# Patient Record
Sex: FEMALE | Race: WHITE | URBAN - METROPOLITAN AREA
[De-identification: names, ages, dates, MRNs, and addresses within clinical notes are randomized per-mention and may not be internally consistent; named-entity substitution may affect disease eponyms.]

---

## 2018-04-21 ENCOUNTER — HOSPITAL ENCOUNTER (EMERGENCY)
Facility: CLINIC | Age: 43
Discharge: HOME | End: 2018-04-21
Attending: EMERGENCY MEDICINE | Admitting: EMERGENCY MEDICINE
Payer: COMMERCIAL

## 2018-04-21 VITALS
SYSTOLIC BLOOD PRESSURE: 120 MMHG | HEART RATE: 67 BPM | DIASTOLIC BLOOD PRESSURE: 78 MMHG | BODY MASS INDEX: 21.4 KG/M2 | WEIGHT: 128.6 LBS | RESPIRATION RATE: 16 BRPM | TEMPERATURE: 98.5 F | OXYGEN SATURATION: 98 %

## 2018-04-21 DIAGNOSIS — R11.0 NAUSEA: ICD-10-CM

## 2018-04-21 DIAGNOSIS — R21 RASH: ICD-10-CM

## 2018-04-21 LAB
ANION GAP SERPL CALCULATED.3IONS-SCNC: 8 MMOL/L (ref 3–14)
BASOPHILS # BLD AUTO: 0 10E9/L (ref 0–0.2)
BASOPHILS NFR BLD AUTO: 0.5 %
BUN SERPL-MCNC: 14 MG/DL (ref 7–30)
CALCIUM SERPL-MCNC: 8.3 MG/DL (ref 8.5–10.1)
CHLORIDE SERPL-SCNC: 110 MMOL/L (ref 94–109)
CO2 SERPL-SCNC: 26 MMOL/L (ref 20–32)
CREAT SERPL-MCNC: 0.68 MG/DL (ref 0.52–1.04)
DIFFERENTIAL METHOD BLD: NORMAL
EOSINOPHIL # BLD AUTO: 0.2 10E9/L (ref 0–0.7)
EOSINOPHIL NFR BLD AUTO: 2.1 %
ERYTHROCYTE [DISTWIDTH] IN BLOOD BY AUTOMATED COUNT: 12.8 % (ref 10–15)
GFR SERPL CREATININE-BSD FRML MDRD: >90 ML/MIN/1.7M2
GLUCOSE SERPL-MCNC: 91 MG/DL (ref 70–99)
HCT VFR BLD AUTO: 41.8 % (ref 35–47)
HGB BLD-MCNC: 13.4 G/DL (ref 11.7–15.7)
IMM GRANULOCYTES # BLD: 0 10E9/L (ref 0–0.4)
IMM GRANULOCYTES NFR BLD: 0.2 %
LYMPHOCYTES # BLD AUTO: 1.7 10E9/L (ref 0.8–5.3)
LYMPHOCYTES NFR BLD AUTO: 19.8 %
MCH RBC QN AUTO: 29.3 PG (ref 26.5–33)
MCHC RBC AUTO-ENTMCNC: 32.1 G/DL (ref 31.5–36.5)
MCV RBC AUTO: 92 FL (ref 78–100)
MONOCYTES # BLD AUTO: 0.5 10E9/L (ref 0–1.3)
MONOCYTES NFR BLD AUTO: 5.6 %
NEUTROPHILS # BLD AUTO: 6.2 10E9/L (ref 1.6–8.3)
NEUTROPHILS NFR BLD AUTO: 71.8 %
NRBC # BLD AUTO: 0 10*3/UL
NRBC BLD AUTO-RTO: 0 /100
PLATELET # BLD AUTO: 213 10E9/L (ref 150–450)
POTASSIUM SERPL-SCNC: 4.1 MMOL/L (ref 3.4–5.3)
RBC # BLD AUTO: 4.57 10E12/L (ref 3.8–5.2)
SODIUM SERPL-SCNC: 144 MMOL/L (ref 133–144)
TSH SERPL DL<=0.005 MIU/L-ACNC: 1.25 MU/L (ref 0.4–4)
WBC # BLD AUTO: 8.6 10E9/L (ref 4–11)

## 2018-04-21 PROCEDURE — 85025 COMPLETE CBC W/AUTO DIFF WBC: CPT | Performed by: EMERGENCY MEDICINE

## 2018-04-21 PROCEDURE — 25000128 H RX IP 250 OP 636: Performed by: EMERGENCY MEDICINE

## 2018-04-21 PROCEDURE — 99283 EMERGENCY DEPT VISIT LOW MDM: CPT | Mod: Z6 | Performed by: EMERGENCY MEDICINE

## 2018-04-21 PROCEDURE — 80048 BASIC METABOLIC PNL TOTAL CA: CPT | Performed by: EMERGENCY MEDICINE

## 2018-04-21 PROCEDURE — 84443 ASSAY THYROID STIM HORMONE: CPT | Performed by: EMERGENCY MEDICINE

## 2018-04-21 PROCEDURE — 99283 EMERGENCY DEPT VISIT LOW MDM: CPT | Mod: 25 | Performed by: EMERGENCY MEDICINE

## 2018-04-21 PROCEDURE — 96360 HYDRATION IV INFUSION INIT: CPT | Performed by: EMERGENCY MEDICINE

## 2018-04-21 RX ORDER — CETIRIZINE HYDROCHLORIDE 10 MG/1
10 TABLET ORAL EVERY EVENING
Qty: 7 TABLET | Refills: 0 | Status: SHIPPED | OUTPATIENT
Start: 2018-04-21 | End: 2018-04-28

## 2018-04-21 RX ORDER — ONDANSETRON 8 MG/1
8 TABLET, ORALLY DISINTEGRATING ORAL EVERY 8 HOURS PRN
Qty: 20 TABLET | Refills: 0 | Status: SHIPPED | OUTPATIENT
Start: 2018-04-21 | End: 2018-04-28

## 2018-04-21 RX ORDER — METHYLPREDNISOLONE 4 MG
TABLET, DOSE PACK ORAL
Qty: 21 TABLET | Refills: 0 | Status: SHIPPED | OUTPATIENT
Start: 2018-04-21

## 2018-04-21 RX ADMIN — SODIUM CHLORIDE 1000 ML: 9 INJECTION, SOLUTION INTRAVENOUS at 20:29

## 2018-04-21 NOTE — ED AVS SNAPSHOT
[facility], Emergency Department    [address]    [facility] [address]    Phone:  [phone]    Fax:  [phone]                                       Iesha Hurt   MRN: [id number]    Department:  [facility], Emergency Department   Date of Visit:  4/21/2018           Patient Information     Date Of Birth          1975        Your diagnoses for this visit were:     Rash     Nausea        You were seen by Diana Cervantes MD.        Discharge Instructions       Go and rest.  Stay well hydrated.     For nausea you can take zofran if needed.     Take the medrol dose pack as prescribed.  This will help allergic reactions improve.     Take zyrtec daily for the next 7 days.     You can try benadryl 50 mg at bedtime for sleep.     Please make an appointment to follow up with Dermatology Clinic (phone: [phone]) if no improvement of your symptoms over the next few days.       Today's results:  Results for orders placed or performed during the hospital encounter of 04/21/18 (from the past 24 hour(s))   CBC with platelets differential   Result Value Ref Range    WBC 8.6 4.0 - 11.0 10e9/L    RBC Count 4.57 3.8 - 5.2 10e12/L    Hemoglobin 13.4 11.7 - 15.7 g/dL    Hematocrit 41.8 35.0 - 47.0 %    MCV 92 78 - 100 fl    MCH 29.3 26.5 - 33.0 pg    MCHC 32.1 31.5 - 36.5 g/dL    RDW 12.8 10.0 - 15.0 %    Platelet Count 213 150 - 450 10e9/L    Diff Method Automated Method     % Neutrophils 71.8 %    % Lymphocytes 19.8 %    % Monocytes 5.6 %    % Eosinophils 2.1 %    % Basophils 0.5 %    % Immature Granulocytes 0.2 %    Nucleated RBCs 0 0 /100    Absolute Neutrophil 6.2 1.6 - 8.3 10e9/L    Absolute Lymphocytes 1.7 0.8 - 5.3 10e9/L    Absolute Monocytes 0.5 0.0 - 1.3 10e9/L    Absolute Eosinophils 0.2 0.0 - 0.7 10e9/L    Absolute Basophils 0.0 0.0 - 0.2 10e9/L    Abs Immature Granulocytes 0.0 0 - 0.4 10e9/L    Absolute Nucleated RBC 0.0    Basic metabolic panel   Result Value Ref Range    Sodium 144 133 - 144  mmol/L    Potassium 4.1 3.4 - 5.3 mmol/L    Chloride 110 (H) 94 - 109 mmol/L    Carbon Dioxide 26 20 - 32 mmol/L    Anion Gap 8 3 - 14 mmol/L    Glucose 91 70 - 99 mg/dL    Urea Nitrogen 14 7 - 30 mg/dL    Creatinine 0.68 0.52 - 1.04 mg/dL    GFR Estimate >90 >60 mL/min/1.7m2    GFR Estimate If Black >90 >60 mL/min/1.7m2    Calcium 8.3 (L) 8.5 - 10.1 mg/dL   TSH with free T4 reflex   Result Value Ref Range    TSH 1.25 0.40 - 4.00 mU/L         24 Hour Appointment Hotline       To make an appointment at any [facility] clinic, call [phone]). If you don't have a family doctor or clinic, we will help you find one. [facility] clinics are conveniently located to serve the needs of you and your family.             Review of your medicines      START taking        Dose / Directions Last dose taken    cetirizine 10 MG tablet   Commonly known as:  zyrTEC   Dose:  10 mg   Quantity:  7 tablet        Take 1 tablet (10 mg) by mouth every evening for 7 days   Refills:  0        methylPREDNISolone 4 MG tablet   Commonly known as:  MEDROL DOSEPAK   Quantity:  21 tablet        Follow package instructions   Refills:  0        ondansetron 8 MG ODT tab   Commonly known as:  ZOFRAN-ODT   Dose:  8 mg   Quantity:  20 tablet        Take 1 tablet (8 mg) by mouth every 8 hours as needed for nausea   Refills:  0          Our records show that you are taking the medicines listed below. If these are incorrect, please call your family doctor or clinic.        Dose / Directions Last dose taken    BENADRYL PO   Dose:  25 mg        Take 25 mg by mouth   Refills:  0        * ADVIL PO   Dose:  600 mg        Take 600 mg by mouth   Refills:  0        * ibuprofen 400 MG tablet   Commonly known as:  ADVIL/MOTRIN   Dose:  400-800 mg   Quantity:  90 tablet        Take 1-2 tablets (400-800 mg) by mouth every 6 hours as needed for other (cramping)   Refills:  0        PRENATAL VITAMINS PO   Dose:  1 tablet        Take 1 tablet by mouth daily    Refills:  0        TYLENOL 325 MG tablet   Dose:  325-650 mg   Generic drug:  acetaminophen        Take 325-650 mg by mouth every 6 hours as needed for mild pain   Refills:  0        UNKNOWN TO PATIENT        Unknown fungal cream   Refills:  0        * Notice:  This list has 2 medication(s) that are the same as other medications prescribed for you. Read the directions carefully, and ask your doctor or other care provider to review them with you.            Prescriptions were sent or printed at these locations (3 Prescriptions)                   Other Prescriptions                Printed at Department/Unit printer (3 of 3)         cetirizine (ZYRTEC) 10 MG tablet               methylPREDNISolone (MEDROL DOSEPAK) 4 MG tablet               ondansetron (ZOFRAN-ODT) 8 MG ODT tab                Procedures and tests performed during your visit     Basic metabolic panel    CBC with platelets differential    TSH with free T4 reflex      Orders Needing Specimen Collection     None      Pending Results     No orders found from 4/19/2018 to 4/22/2018.            Pending Culture Results     No orders found from 4/19/2018 to 4/22/2018.            Pending Results Instructions     If you had any lab results that were not finalized at the time of your Discharge, you can call the ED Lab Result RN at [phone]. You will be contacted by this team for any positive Lab results or changes in treatment. The nurses are available 7 days a week from 10A to 6:30P.  You can leave a message 24 hours per day and they will return your call.        Thank you for choosing [facility]       Thank you for choosing [facility] for your care. Our goal is always to provide you with excellent care. Hearing back from our patients is one way we can continue to improve our services. Please take a few minutes to complete the written survey that you may receive in the mail after you visit with us. Thank you!        3rd [employer] Information     [employer] gives you  secure access to your electronic health record. If you see a primary care provider, you can also send messages to your care team and make appointments. If you have questions, please call your primary care clinic.  If you do not have a primary care provider, please call [phone] and they will assist you.        Care EveryWhere ID     This is your Care EveryWhere ID. This could be used by other organizations to access your [facility] medical records  [id number]        Equal Access to Services     NAYLA ARCHIBALD : Myrtle tenao Shaw, wacierada lufawn, qaybta kaalmalibertad tanner, sivakumar boo. So [facility] [phone].    ATENCIÓN: Si habla español, tiene a damon disposición servicios gratuitos de asistencia lingüística. Llame al [phone].    We comply with applicable federal civil rights laws and [place] laws. We do not discriminate on the basis of race, color, national origin, age, disability, sex, sexual orientation, or gender identity.            After Visit Summary       This is your record. Keep this with you and show to your community pharmacist(s) and doctor(s) at your next visit.

## 2018-04-21 NOTE — ED AVS SNAPSHOT
[facility], [place], Emergency Department    [address]    [facility] [place] [address]    Phone:  [phone]    Fax:  [phone]                                       Iesha Hurt   MRN: [id number]    Department:  [facility], Emergency Department   Date of Visit:  4/21/2018           After Visit Summary Signature Page     I have received my discharge instructions, and my questions have been answered. I have discussed any challenges I see with this plan with the nurse or doctor.    ..........................................................................................................................................  Patient/Patient Representative Signature      ..........................................................................................................................................  Patient Representative Print Name and Relationship to Patient    ..................................................               ................................................  Date                                            Time    ..........................................................................................................................................  Reviewed by Signature/Title    ...................................................              ..............................................  Date                                                            Time

## 2018-04-22 NOTE — ED TRIAGE NOTES
Patient c/o rash which started on her back and has spread to her arms and torso which itches more at night.  Patient c/o headache, nausea, and dizziness since Thursday.  Patient reports she has been living at the [facility] while her daughter is hospitalized.

## 2018-04-22 NOTE — DISCHARGE INSTRUCTIONS
Go and rest.  Stay well hydrated.     For nausea you can take zofran if needed.     Take the medrol dose pack as prescribed.  This will help allergic reactions improve.     Take zyrtec daily for the next 7 days.     You can try benadryl 50 mg at bedtime for sleep.     Please make an appointment to follow up with Dermatology Clinic (phone: [phone]) if no improvement of your symptoms over the next few days.       Today's results:  Results for orders placed or performed during the hospital encounter of 04/21/18 (from the past 24 hour(s))   CBC with platelets differential   Result Value Ref Range    WBC 8.6 4.0 - 11.0 10e9/L    RBC Count 4.57 3.8 - 5.2 10e12/L    Hemoglobin 13.4 11.7 - 15.7 g/dL    Hematocrit 41.8 35.0 - 47.0 %    MCV 92 78 - 100 fl    MCH 29.3 26.5 - 33.0 pg    MCHC 32.1 31.5 - 36.5 g/dL    RDW 12.8 10.0 - 15.0 %    Platelet Count 213 150 - 450 10e9/L    Diff Method Automated Method     % Neutrophils 71.8 %    % Lymphocytes 19.8 %    % Monocytes 5.6 %    % Eosinophils 2.1 %    % Basophils 0.5 %    % Immature Granulocytes 0.2 %    Nucleated RBCs 0 0 /100    Absolute Neutrophil 6.2 1.6 - 8.3 10e9/L    Absolute Lymphocytes 1.7 0.8 - 5.3 10e9/L    Absolute Monocytes 0.5 0.0 - 1.3 10e9/L    Absolute Eosinophils 0.2 0.0 - 0.7 10e9/L    Absolute Basophils 0.0 0.0 - 0.2 10e9/L    Abs Immature Granulocytes 0.0 0 - 0.4 10e9/L    Absolute Nucleated RBC 0.0    Basic metabolic panel   Result Value Ref Range    Sodium 144 133 - 144 mmol/L    Potassium 4.1 3.4 - 5.3 mmol/L    Chloride 110 (H) 94 - 109 mmol/L    Carbon Dioxide 26 20 - 32 mmol/L    Anion Gap 8 3 - 14 mmol/L    Glucose 91 70 - 99 mg/dL    Urea Nitrogen 14 7 - 30 mg/dL    Creatinine 0.68 0.52 - 1.04 mg/dL    GFR Estimate >90 >60 mL/min/1.7m2    GFR Estimate If Black >90 >60 mL/min/1.7m2    Calcium 8.3 (L) 8.5 - 10.1 mg/dL   TSH with free T4 reflex   Result Value Ref Range    TSH 1.25 0.40 - 4.00 mU/L

## 2018-04-23 ENCOUNTER — OFFICE VISIT (OUTPATIENT)
Dept: DERMATOLOGY | Facility: CLINIC | Age: 43
End: 2018-04-23
Payer: COMMERCIAL

## 2018-04-23 DIAGNOSIS — Z87.2: Primary | ICD-10-CM

## 2018-04-23 DIAGNOSIS — Z87.2: ICD-10-CM

## 2018-04-23 RX ORDER — FLUOCINONIDE 0.5 MG/G
CREAM TOPICAL 2 TIMES DAILY
Qty: 60 G | Refills: 1 | Status: SHIPPED | OUTPATIENT
Start: 2018-04-23

## 2018-04-23 ASSESSMENT — PAIN SCALES - GENERAL: PAINLEVEL: NO PAIN (0)

## 2018-04-23 NOTE — MR AVS SNAPSHOT
After Visit Summary   4/23/2018    Iesha Hurt    MRN: [id number]           Patient Information     Date Of Birth          1975        Visit Information        Provider Department      4/23/2018 5:30 PM Ursula Schmidt PA-C [facility] Dermatology        Today's Diagnoses     History of pityriasis rosea    -  1       Follow-ups after your visit        Who to contact     Please call your clinic at [phone] to:    Ask questions about your health    Make or cancel appointments    Discuss your medicines    Learn about your test results    Speak to your doctor            Additional Information About Your Visit        MyChart Information     [employer] gives you secure access to your electronic health record. If you see a primary care provider, you can also send messages to your care team and make appointments. If you have questions, please call your primary care clinic.  If you do not have a primary care provider, please call [phone] and they will assist you.      [employer] is an electronic gateway that provides easy, online access to your medical records. With [employer], you can request a clinic appointment, read your test results, renew a prescription or communicate with your care team.     To access your existing account, please contact your [facility] or call [phone] for assistance.        Care EveryWhere ID     This is your Care EveryWhere ID. This could be used by other organizations to access your [facility] medical records  [id number]        Your Vitals Were     Last Period                   04/10/2018            Blood Pressure from Last 3 Encounters:   04/21/18 120/78   02/26/16 112/70   02/25/16 113/88    Weight from Last 3 Encounters:   04/21/18 58.3 kg (128 lb 9.6 oz)   02/26/16 70.9 kg (156 lb 3.2 oz)   02/22/16 74.8 kg (165 lb)                 Today's Medication Changes          These changes are accurate as of 4/23/18 11:59 PM.  If you have any  questions, ask your nurse or doctor.               Start taking these medicines.        Dose/Directions    fluocinonide 0.05 % cream   Commonly known as:  LIDEX   Used for:  History of pityriasis rosea   Started by:  Ursula Schmidt PA-C        Apply topically 2 times daily To itchy areas, avoiding the face.   Quantity:  60 g   Refills:  1            Where to get your medicines      These medications were sent to [place], [place] - [address]  [address], [facility] [address]    Hours:  TRANSPLANT PHONE NUMBER [phone] Phone:  [phone]     fluocinonide 0.05 % cream                Primary Care Provider Office Phone # Fax #    Lesley English TahirarayrayHANH [facility] [phone] [address] [address]        Equal Access to Services     DELIA [facility] : Hadii isaiah ku hadasho Soomaali, waaxda luqadaha, qaybta kaalmada adeegyada, waxay zaidain hayaan maliha lucero . So [facility] [phone].    ATENCIÓN: Si habla español, tiene a damon disposición servicios gratuitos de asistencia lingüística. Llame al [phone].    We comply with applicable federal civil rights laws and [place] laws. We do not discriminate on the basis of race, color, national origin, age, disability, sex, sexual orientation, or gender identity.            Thank you!     Thank you for choosing [facility] DERMATOLOGY  for your care. Our goal is always to provide you with excellent care. Hearing back from our patients is one way we can continue to improve our services. Please take a few minutes to complete the written survey that you may receive in the mail after your visit with us. Thank you!             Your Updated Medication List - Protect others around you: Learn how to safely use, store and throw away your medicines at www.disposemymeds.org.          This list is accurate as of 4/23/18 11:59 PM.  Always use your most recent med list.                    Brand Name Dispense Instructions for use Diagnosis    BENADRYL PO      Take 25 mg by mouth        cetirizine 10 MG tablet    zyrTEC    7 tablet    Take 1 tablet (10 mg) by mouth every evening for 7 days        fluocinonide 0.05 % cream    LIDEX    60 g    Apply topically 2 times daily To itchy areas, avoiding the face.    History of pityriasis rosea       * ADVIL PO      Take 600 mg by mouth        * ibuprofen 400 MG tablet    ADVIL/MOTRIN    90 tablet    Take 1-2 tablets (400-800 mg) by mouth every 6 hours as needed for other (cramping)    Vaginal delivery       methylPREDNISolone 4 MG tablet    MEDROL DOSEPAK    21 tablet    Follow package instructions        ondansetron 8 MG ODT tab    ZOFRAN-ODT    20 tablet    Take 1 tablet (8 mg) by mouth every 8 hours as needed for nausea        PRENATAL VITAMINS PO      Take 1 tablet by mouth daily        TYLENOL 325 MG tablet   Generic drug:  acetaminophen      Take 325-650 mg by mouth every 6 hours as needed for mild pain        UNKNOWN TO PATIENT      Unknown fungal cream        * Notice:  This list has 2 medication(s) that are the same as other medications prescribed for you. Read the directions carefully, and ask your doctor or other care provider to review them with you.

## 2018-04-23 NOTE — LETTER
4/23/2018       RE: Iesha Hurt   [address] [facility] [address]     Dear Colleague,    Thank you for referring your patient, Iesha Hurt, to the [facility] DERMATOLOGY at [facility]. Please see a copy of my visit note below.    [facility] Dermatology Note    Dermatology Problem List:  1. Pityriasis rosea  - prednisone taper prescribed 4/21/18, Zyrtec, fluocinonide (lidex) 0.05% cream    CC:   Chief Complaint   Patient presents with     Derm Problem     Iesha is here today to be seen for a rash.      Date of Service: Apr 23, 2018    History of Present Illness:  Ms. Iesha Hurt is a 43 year old female who presents for an evaluation of rash as a new patient. Today the patient reports that her rash started in the middle of her back about 2 weeks ago and then in spread up her back to her neck, arms, and stomach. The rash on her back, arms, and stomach look different. It is very pruritic and bothersome. She first saw urgent care on 4/19/18 and she was told it was ring worm, but her symptoms did not go away with a topical medications. Then she was seen at the emergency department on 4/21/18 when she was prescribed a tapering course of prednisone, Zyrtec, and Ondanestron. She states that today she is feeling better after starting the prednisone, she has not take the Zyrtec. She is very nervous about spreading anything to her daughter as she is being treated for AML at [facility]. She states that the part on the lower back is healing up. She took photodocumentation of the rash spreading. She notes that a month ago or so she had one acne lesion which was not normal as she never breaks out, then she had a cold sore on her lip, then she developed the rash. She denies any allergic reaction to any laundry detergents or products that she knows of. She has also used a triamcinolone 0.1% cream with no improvement. She admits the rash looks really good today  and thinks it is finally going away. The patient reports no other lesions of concern at this time.    Otherwise, the patient reports no painful, bleeding, nonhealing, or pruritic lesions, and denies new or changing moles.    Past Medical History:   Patient Active Problem List   Diagnosis     CARDIOVASCULAR SCREENING; LDL GOAL LESS THAN 160     IBS (irritable bowel syndrome)     Metrorrhagia     Eating disorder     Migraine     Insomnia     Essential tremor     Hypermobility arthralgia     Excessive sleepiness     Condyloma     Psychological disorder     Diagnostic skin and sensitization tests     Bulimia nervosa, purging type     Need for Tdap vaccination     AMA (advanced maternal age) multigravida 35+-  + Trisomy 21  SEE OVERVIEW     Rh negative state in antepartum period-See overview     GBS (group B streptococcus) UTI complicating pregnancy     High-risk pregnancy supervision, second trimester-Pt transferring care to [facility]     Congenital heart disease, fetal, affecting care of mother, antepartum, not applicable or unspecified fetus     Positive result on maternal serum screen for trisomy 21     Supervision of high risk pregnancy, antepartum     Pregnancy     Vaginal delivery     Past Medical History:   Diagnosis Date     Diagnostic skin and sensitization tests 12/19/11 for foods and molds--all NEGATIVE.     Disorder of adrenal gland (H) 2011     Eating disorder     History of  Bulemia     Fibromyalgia      IBS (irritable bowel syndrome)      Insomnia 3/21/2011     Metrorrhagia     on contraception     Migraine      Psychological disorder 11/22/2011     Tremors      Past Surgical History:   Procedure Laterality Date     COLONOSCOPY       ENDOSCOPY       HC BREATH HYDROGEN TEST  11/30/2011    Procedure:HYDROGEN BREATH TEST; Surgeon:PIETRO ZHENG; Location:[facility]     HC REPAIR UMBILICAL ULYSSES,5+Y/O,REDUC  2000     tonsillectomy       tubes in ears       Social History:  Patient is  and is a  mother.  She is not working currently and stays at the hospital with her daughter as she is fighting AML at [facility].     Family History:  No family history of skin diseases, such as eczema or psoriasis. No one else around her have skin eruptions.     Medications:  Current Outpatient Prescriptions   Medication Sig Dispense Refill     acetaminophen (TYLENOL) 325 MG tablet Take 325-650 mg by mouth every 6 hours as needed for mild pain       cetirizine (ZYRTEC) 10 MG tablet Take 1 tablet (10 mg) by mouth every evening for 7 days 7 tablet 0     DiphenhydrAMINE HCl (BENADRYL PO) Take 25 mg by mouth       Ibuprofen (ADVIL PO) Take 600 mg by mouth       ibuprofen (ADVIL,MOTRIN) 400 MG tablet Take 1-2 tablets (400-800 mg) by mouth every 6 hours as needed for other (cramping) 90 tablet 0     methylPREDNISolone (MEDROL DOSEPAK) 4 MG tablet Follow package instructions 21 tablet 0     ondansetron (ZOFRAN-ODT) 8 MG ODT tab Take 1 tablet (8 mg) by mouth every 8 hours as needed for nausea 20 tablet 0     Prenatal Multivit-Min-Fe-FA (PRENATAL VITAMINS PO) Take 1 tablet by mouth daily       UNKNOWN TO PATIENT Unknown fungal cream       Allergies:  Allergies   Allergen Reactions     Nka [No Known Allergies]      Review of Systems:  - Skin: As above in HPI. No additional skin concerns.    Physical exam:  Vitals: LMP 04/10/2018  GEN: This is a well developed, well-nourished female in no acute distress, in a pleasant mood.      SKIN: Waist-up skin, which includes the head/face, neck, both arms, chest, back, abdomen, digits and/or nails was examined.  - Slightly hyperpigmented patches on lower back from previous eruption. There are a few resolving scaly thin plaques on the lower posterior neck.   - No other lesions of concern on areas examined.     Impression/Plan:  1. Pityriasis rosea. Photos provided by patient seem consistent with pityriasis rosea.    - Discussion of diagnosis.  - Finish prednisone taper course.   - OK to  start taking Zyrtec.  - If needed, Start fluocinonide (lidex) 0.05% cream - apply to affected areas BID for up to 2 weeks. Take 1 week break. Restart if needed.  - Labs obtained today: anti treponema    Follow-up in 2 weeks, earlier for new or changing lesions. Pt defers an appointment at this time.     Staff Involved:  Staff Only    Scribe Disclosure:   I, Tiffanie Doe, am serving as a scribe to document services personally performed by Ursula Schmidt PA-C, based on data collection and the provider's statements to me.    Provider Disclosure:   The documentation recorded by the scribe accurately reflects the services I personally performed and the decisions made by me.    All risks, benefits and alternatives were discussed with patient.  Patient is in agreement and understands the assessment and plan.  All questions were answered.  Sun Screen Education was given.   Return to Clinic in 2 weeks or sooner as needed.   Ursula Schmidt PA-C   [facility] Dermatology Clinic

## 2018-04-23 NOTE — NURSING NOTE
Dermatology Rooming Note    Iesha Hurt's goals for this visit include:   Chief Complaint   Patient presents with     Derm Problem     Iesha is here today to be seen for a rash.      Esperanza Tavera MA

## 2018-04-23 NOTE — PROGRESS NOTES
[facility] Dermatology Note    Dermatology Problem List:  1. Pityriasis rosea  - prednisone taper prescribed 4/21/18, Zyrtec, fluocinonide (lidex) 0.05% cream    CC:   Chief Complaint   Patient presents with     Derm Problem     Iesha is here today to be seen for a rash.      Date of Service: Apr 23, 2018    History of Present Illness:  Ms. Iesha Hurt is a 43 year old female who presents for an evaluation of rash as a new patient. Today the patient reports that her rash started in the middle of her back about 2 weeks ago and then in spread up her back to her neck, arms, and stomach. The rash on her back, arms, and stomach look different. It is very pruritic and bothersome. She first saw urgent care on 4/19/18 and she was told it was ring worm, but her symptoms did not go away with a topical medications. Then she was seen at the emergency department on 4/21/18 when she was prescribed a tapering course of prednisone, Zyrtec, and Ondanestron. She states that today she is feeling better after starting the prednisone, she has not take the Zyrtec. She is very nervous about spreading anything to her daughter as she is being treated for AML at [facility]. She states that the part on the lower back is healing up. She took photodocumentation of the rash spreading. She notes that a month ago or so she had one acne lesion which was not normal as she never breaks out, then she had a cold sore on her lip, then she developed the rash. She denies any allergic reaction to any laundry detergents or products that she knows of. She has also used a triamcinolone 0.1% cream with no improvement. She admits the rash looks really good today and thinks it is finally going away. The patient reports no other lesions of concern at this time.    Otherwise, the patient reports no painful, bleeding, nonhealing, or pruritic lesions, and denies new or changing moles.    Past Medical History:   Patient Active Problem  List   Diagnosis     CARDIOVASCULAR SCREENING; LDL GOAL LESS THAN 160     IBS (irritable bowel syndrome)     Metrorrhagia     Eating disorder     Migraine     Insomnia     Essential tremor     Hypermobility arthralgia     Excessive sleepiness     Condyloma     Psychological disorder     Diagnostic skin and sensitization tests     Bulimia nervosa, purging type     Need for Tdap vaccination     AMA (advanced maternal age) multigravida 35+-  + Trisomy 21  SEE OVERVIEW     Rh negative state in antepartum period-See overview     GBS (group B streptococcus) UTI complicating pregnancy     High-risk pregnancy supervision, second trimester-Pt transferring care to [facility]     Congenital heart disease, fetal, affecting care of mother, antepartum, not applicable or unspecified fetus     Positive result on maternal serum screen for trisomy 21     Supervision of high risk pregnancy, antepartum     Pregnancy     Vaginal delivery     Past Medical History:   Diagnosis Date     Diagnostic skin and sensitization tests 12/19/11 for foods and molds--all NEGATIVE.     Disorder of adrenal gland (H) 2011     Eating disorder     History of  Bulemia     Fibromyalgia      IBS (irritable bowel syndrome)      Insomnia 3/21/2011     Metrorrhagia     on contraception     Migraine      Psychological disorder 11/22/2011     Tremors      Past Surgical History:   Procedure Laterality Date     COLONOSCOPY       ENDOSCOPY       HC BREATH HYDROGEN TEST  11/30/2011    Procedure:HYDROGEN BREATH TEST; Surgeon:PIETRO ZHENG; Location:[facility]     HC REPAIR UMBILICAL ULYSSES,5+Y/O,REDUC  2000     tonsillectomy       tubes in ears       Social History:  Patient is  and is a mother.  She is not working currently and stays at the hospital with her daughter as she is fighting AML [facility].     Family History:  No family history of skin diseases, such as eczema or psoriasis. No one else around her have skin eruptions.      Medications:  Current Outpatient Prescriptions   Medication Sig Dispense Refill     acetaminophen (TYLENOL) 325 MG tablet Take 325-650 mg by mouth every 6 hours as needed for mild pain       cetirizine (ZYRTEC) 10 MG tablet Take 1 tablet (10 mg) by mouth every evening for 7 days 7 tablet 0     DiphenhydrAMINE HCl (BENADRYL PO) Take 25 mg by mouth       Ibuprofen (ADVIL PO) Take 600 mg by mouth       ibuprofen (ADVIL,MOTRIN) 400 MG tablet Take 1-2 tablets (400-800 mg) by mouth every 6 hours as needed for other (cramping) 90 tablet 0     methylPREDNISolone (MEDROL DOSEPAK) 4 MG tablet Follow package instructions 21 tablet 0     ondansetron (ZOFRAN-ODT) 8 MG ODT tab Take 1 tablet (8 mg) by mouth every 8 hours as needed for nausea 20 tablet 0     Prenatal Multivit-Min-Fe-FA (PRENATAL VITAMINS PO) Take 1 tablet by mouth daily       UNKNOWN TO PATIENT Unknown fungal cream       Allergies:  Allergies   Allergen Reactions     Nka [No Known Allergies]      Review of Systems:  - Skin: As above in HPI. No additional skin concerns.    Physical exam:  Vitals: LMP 04/10/2018  GEN: This is a well developed, well-nourished female in no acute distress, in a pleasant mood.      SKIN: Waist-up skin, which includes the head/face, neck, both arms, chest, back, abdomen, digits and/or nails was examined.  - Slightly hyperpigmented patches on lower back from previous eruption. There are a few resolving scaly thin plaques on the lower posterior neck.   - No other lesions of concern on areas examined.     Impression/Plan:  1. Pityriasis rosea. Photos provided by patient seem consistent with pityriasis rosea.    - Discussion of diagnosis.  - Finish prednisone taper course.   - OK to start taking Zyrtec.  - If needed, Start fluocinonide (lidex) 0.05% cream - apply to affected areas BID for up to 2 weeks. Take 1 week break. Restart if needed.  - Labs obtained today: anti treponema    Follow-up in 2 weeks, earlier for new or changing  lesions. Pt defers an appointment at this time.     Staff Involved:  Staff Only    Scribe Disclosure:   I, Tiffanie Doe, am serving as a scribe to document services personally performed by Ursula Schmidt PA-C, based on data collection and the provider's statements to me.    Provider Disclosure:   The documentation recorded by the scribe accurately reflects the services I personally performed and the decisions made by me.    All risks, benefits and alternatives were discussed with patient.  Patient is in agreement and understands the assessment and plan.  All questions were answered.  Sun Screen Education was given.   Return to Clinic in 2 weeks or sooner as needed.   Ursula Schmidt PA-C   [facility]

## 2018-04-24 LAB — T PALLIDUM IGG+IGM SER QL: NEGATIVE

## 2018-06-03 ASSESSMENT — ENCOUNTER SYMPTOMS
DIZZINESS: 1
RHINORRHEA: 0
CHILLS: 0
ABDOMINAL PAIN: 0
NAUSEA: 1
SORE THROAT: 0
HEADACHES: 1
CARDIOVASCULAR NEGATIVE: 1
EYES NEGATIVE: 1
RESPIRATORY NEGATIVE: 1
FEVER: 0
DIARRHEA: 0

## 2018-06-03 NOTE — ED PROVIDER NOTES
History     Chief Complaint   Patient presents with     Rash     Patient diagnosed with ringworm on her back per Urgent Care, reports rash has been extending to abdomen and bilateral arms over the past few days and has a different appearance.  Patient reports itching increases at night, has not traveled but has been spending a lot of time at "[employer]" with her daughter     Dizziness     Dizziness and nausea since Thursday     Headache     Headache since Thursday--improving with advil at 1400     HPI  Iesha Hurt is a 43 year old female who presents for evaluation of a rash.  Her daughter is at [facility] for AML and sheis afraid this rash is contagious.  She says the rash started in the middle of her back 2 weeks ago. It is involving her back, neck, arms, stomach. She was seen at  and they thought it was ringworm.  They gave her meds that didn't help.  The rash is itchy especially at night.  She wants to know what it is and if it is contagious.  She is also feeling nauseated, dizzy and having a headache.  She is not pregnant.  No f/c.        I have reviewed the Medications, Allergies, Past Medical and Surgical History, and Social History in the Epic system.    Review of Systems   Constitutional: Negative for chills and fever.   HENT: Negative for ear pain, rhinorrhea and sore throat.    Eyes: Negative.    Respiratory: Negative.    Cardiovascular: Negative.    Gastrointestinal: Positive for nausea. Negative for abdominal pain and diarrhea.   Neurological: Positive for dizziness and headaches.   All other systems reviewed and are negative.      Physical Exam   BP: 129/64  Pulse: 75  Temp: 97.6  F (36.4  C)  Resp: 16  Weight: 58.3 kg (128 lb 9.6 oz)  SpO2: 98 %      Physical Exam   Constitutional: She is oriented to person, place, and time. She appears well-developed and well-nourished. No distress.   HENT:   Head: Normocephalic and atraumatic.   Right Ear: External ear normal.   Left Ear: External ear  normal.   Nose: Nose normal.   Mouth/Throat: Oropharynx is clear and moist.   Eyes: EOM are normal. Pupils are equal, round, and reactive to light. Right eye exhibits no discharge. Left eye exhibits no discharge. No scleral icterus.   Neck: Normal range of motion. Neck supple.   Cardiovascular: Normal rate, regular rhythm and normal heart sounds.    Pulmonary/Chest: Effort normal and breath sounds normal.   Abdominal: Soft. Bowel sounds are normal. She exhibits no distension and no mass. There is no tenderness. There is no rebound and no guarding.   Musculoskeletal: Normal range of motion.   Neurological: She is alert and oriented to person, place, and time.   Skin: Skin is warm and dry. She is not diaphoretic.   Hyperpigmented lesions some scaly.  On torso, arms, neck   Psychiatric: She has a normal mood and affect.   Nursing note and vitals reviewed.      ED Course     ED Course     Procedures           Labs Ordered and Resulted from Time of ED Arrival Up to the Time of Departure from the ED - No data to display         Assessments & Plan (with Medical Decision Making)   The patient presents with rash of concern and also dizziness and nausea.  She appears non toxic.  No fever. Her abdominal exam is benign.  She denies being pregnant and denies urinary concerns.  She is under a lot of stress.  Her child is going through treatment for AML.  Basic labs were sent, reviewed and normal.  She was given NS IV. Her main concern is a rash that she is worried could be contagious.  She wants to be with her daughter but doesn't want her to be exposed to it if it is contagious.  I spoke to derm and asked them to help see her so define what the rash is.  She was placed on a medrol dose pack, zyrtec and benadryl at bedtime for sleep and itchiness.  They asked her to call for an appointment and they will see her asap in clinic.  She was given zofran for nausea.       I have reviewed the nursing notes.    I have reviewed the  findings, diagnosis, plan and need for follow up with the patient.    Discharge Medication List as of 4/21/2018 10:58 PM      START taking these medications    Details   cetirizine (ZYRTEC) 10 MG tablet Take 1 tablet (10 mg) by mouth every evening for 7 days, Disp-7 tablet, R-0, Local Print      methylPREDNISolone (MEDROL DOSEPAK) 4 MG tablet Follow package instructions, Disp-21 tablet, R-0, Local Print      ondansetron (ZOFRAN-ODT) 8 MG ODT tab Take 1 tablet (8 mg) by mouth every 8 hours as needed for nausea, Disp-20 tablet, R-0, Local Print             Final diagnoses:   Rash   Nausea       4/21/2018   [facility] [place], EMERGENCY DEPARTMENT     Diana Cervantes MD  06/03/18 [id number]

## 2021-10-04 ENCOUNTER — OFFICE VISIT - RIVER FALLS (OUTPATIENT)
Dept: FAMILY MEDICINE | Facility: CLINIC | Age: 46
End: 2021-10-04

## 2022-02-11 VITALS
DIASTOLIC BLOOD PRESSURE: 74 MMHG | WEIGHT: 133 LBS | HEART RATE: 86 BPM | BODY MASS INDEX: 22.13 KG/M2 | SYSTOLIC BLOOD PRESSURE: 107 MMHG

## 2022-02-15 NOTE — PROGRESS NOTES
Patient:   TATY HARE            MRN: [id number]            FIN: [id number]               Age:   46 years     Sex:  Female     :  1975   Associated Diagnoses:   Pre-employment examination; Screening for tuberculosis; Encounter for pre-employment low back evaluation screening   Author:   Franko Rasheed PA-C   Diagnosis  Pre-employment examination (OHY69-DB Z02.1).  Patient Instructions   Visit Information      Date of Service: 10/04/2021 01:40 pm  Performing Location: [facility]  Encounter#: [id number]      Primary Care Provider (PCP):  NONE ,       Referring Provider:  No referring provider recorded for selected visit.   Visit type:  Pre-employment exam   .    Accompanied by:  No one.    Source of history:  Self.    Referral source:  Self.    History limitation:  None.       Chief Complaint   10/4/2021 1:57 PM CDT    Preemployment exam, tb questionnaire      Well Adult History   Well Adult History             The patient presents for well adult exam.  The patient's general health status is described as good.  The patient's diet is described as balanced.  Exercise: routine.  Associated symptoms consist of none.  Last menstrual period: regular.  Additional pertinent history: daily caffeine use, tobacco use none and alcohol use socially.        Review of Systems   Constitutional:  Negative.    Eye:  Negative.    Respiratory:  Negative.    Cardiovascular:  Negative.    Breast:  Negative.    Gastrointestinal:  Negative.    Genitourinary:  Negative.    Gynecologic:  Negative.    Hematology/Lymphatics:  Negative.    Endocrine:  Negative.    Immunologic:  Negative.    Musculoskeletal:  Negative.    Integumentary:  Negative.    Neurologic:  Negative.    Psychiatric:  Negative.       Health Status   Allergies:    Allergic Reactions (All)  No Known Medication Allergies   Medications:  (Selected)   ,    Medications          No Known Home Medications     Problem list:    No problem items selected or  recorded.      Histories   Past Medical History:    No active or resolved past medical history items have been selected or recorded.   Family History:    No family history items have been selected or recorded.   Procedure history:    No active procedure history items have been selected or recorded.   Social History:        Electronic Cigarette/Vaping Assessment            Electronic Cigarette Use: Never.      Tobacco Assessment            Never (less than 100 in lifetime)        Physical Examination   Vital Signs   10/4/2021 1:57 PM CDT Peripheral Pulse Rate 86 bpm    HR Method Electronic    Systolic Blood Pressure 107 mmHg    Diastolic Blood Pressure 74 mmHg    Mean Arterial Pressure 85 mmHg    BP Site Right arm    BP Method Electronic      Measurements from flowsheet : Measurements   10/4/2021 1:57 PM CDT    Weight Measured - Standard                133.0 lb     General:  Alert and oriented, No acute distress.    Eye:  Pupils are equal, round and reactive to light, Extraocular movements are intact, Normal conjunctiva.    HENT:  Normocephalic, Tympanic membranes are clear, Normal hearing, Oral mucosa is moist, No pharyngeal erythema, PE tube right TM.    Neck:  Supple, Non-tender, No lymphadenopathy, No thyromegaly.    Respiratory:  Lungs are clear to auscultation, Respirations are non-labored, Breath sounds are equal.    Cardiovascular:  Normal rate, Regular rhythm, No murmur.    Gastrointestinal:  Soft, Non-tender, Non-distended, No organomegaly.    Genitourinary:  No costovertebral angle tenderness.    Musculoskeletal:  Normal range of motion, Normal strength, No tenderness, No swelling.    Integumentary:  Warm, Pink, No rash.    Neurologic:  Alert, Oriented, Normal sensory, Normal motor function, No focal deficits.    Psychiatric:  Cooperative, Appropriate mood & affect.       Impression and Plan   Diagnosis     Pre-employment examination (OIX71-WA Z02.1).     Screening for tuberculosis (NUV25-ZT Z11.1).      Encounter for pre-employment low back evaluation screening (GTR84-TL Z02.1).     Patient Instructions:       Counseled: Patient, Verbalized understanding.    See form in chart. TB screen negative. No need for testing. Cleared for employment without restriction. She will call ENT to check on PE tube Score of seven on low back screen, low risk of back injury.

## 2022-02-15 NOTE — NURSING NOTE
Comprehensive Intake Entered On:  10/4/2021 2:03 PM CDT    Performed On:  10/4/2021 1:57 PM CDT by Ann Dwyer CMA               Summary   Chief Complaint :   Preemployment exam, tb questionnaire   Weight Measured :   133.0 lb(Converted to: 133 lb 0 oz, 60.328 kg)    Systolic Blood Pressure :   107 mmHg   Diastolic Blood Pressure :   74 mmHg   Mean Arterial Pressure :   85 mmHg   Peripheral Pulse Rate :   86 bpm   BP Site :   Right arm   BP Method :   Electronic   HR Method :   Electronic   Ann Dwyer CMA - 10/4/2021 1:57 PM CDT   Health Status   Allergies Verified? :   Yes   Medication History Verified? :   Yes   Medical History Verified? :   Yes   Tobacco Use? :   Never smoker   Ann Dwyer CMA - 10/4/2021 1:57 PM CDT   Consents   Consent for Immunization Exchange :   Consent Granted   Consent for Immunizations to Providers :   Consent Granted   Ann Dwyer CMA - 10/4/2021 1:57 PM CDT   Meds / Allergies   (As Of: 10/4/2021 2:03:54 PM CDT)   Allergies (Active)   No Known Medication Allergies  Estimated Onset Date:   Unspecified ; Created By:   Ann Dwyer CMA; Reaction Status:   Active ; Category:   Drug ; Substance:   No Known Medication Allergies ; Type:   Allergy ; Updated By:   Ann Dwyer CMA; Reviewed Date:   10/4/2021 2:02 PM CDT        Medication List   (As Of: 10/4/2021 2:03:54 PM CDT)   No Known Home Medications     Ann Dwyer CMA - 10/4/2021 2:02:11 PM           Vision Testing POC   Corrective Lenses :   None   Eye, Left Visual Acuity :   20/50   Eye, Right Visual Acuity :   20/20   Ann Dwyer CMA - 10/4/2021 1:57 PM CDT   Social History   Social History   (As Of: 10/4/2021 2:03:54 PM CDT)   Tobacco:        Never (less than 100 in lifetime)   (Last Updated: 10/4/2021 2:02:18 PM CDT by Ann Dwyer CMA)          Electronic Cigarette/Vaping:        Electronic Cigarette Use: Never.   (Last Updated: 10/4/2021 2:02:22 PM CDT by Ann Dwyer CMA)